# Patient Record
(demographics unavailable — no encounter records)

---

## 2024-12-23 NOTE — PHYSICAL EXAM
[4+] : 4+ [Normal Gait and Station] : normal gait and station [Normal muscle strength, symmetry and tone of facial, head and neck musculature] : normal muscle strength, symmetry and tone of facial, head and neck musculature [Normal] : no cervical lymphadenopathy [Exposed Vessel] : left anterior vessel not exposed [Increased Work of Breathing] : no increased work of breathing with use of accessory muscles and retractions

## 2024-12-23 NOTE — ASSESSMENT
[FreeTextEntry1] : 5 year F s/p tonsillectomy and adenoidectomy. Discussed that adenoids can grow back and that we will monitor for now. Any signs of recurrent nasal congestion or snoring they should let us know. Tonsils do not grow back. If persistent snoring sometimes will get repeat PSG. Monitor for now.  Due to concern for continued sleep symptoms post T&A will refer to Dr. Wakefield for sleep medicine evaluation.   Consider PSG. Defer to Dr. HERNANDEZ

## 2024-12-23 NOTE — REASON FOR VISIT
[Post-Operative Visit] : a post-operative visit for [Nasal Obstruction] : nasal obstruction [Nasal Discharge] : nasal discharge [Sleep Apnea/ Snoring] : sleep apnea/ snoring [Parents] : parents

## 2024-12-23 NOTE — HISTORY OF PRESENT ILLNESS
[de-identified] : 12-23-24 5 year M s/p T&A, ITR in 6/13/24. Doing well and most symptoms have improved except for daytime tiredness, still noticeable with some behavioral concerns. Nasal congestion with URIs, no ear or throat infections. Hx of asthma.   4-15-24 4 year M with SDB and nasal congestion History of asthma Noted to have large tonsils by dentist and PMD Planned hernia surgery with Dr. Garrett - want to consider doing surgery with ENT if needed  +Nasal congestion No prior nasal steroid use +Snoring, apnea, daytime tiredness, wakes up up at night, open mouth breathing, bruxism No prior PSG  No recent ear infections No otorrhea Passed [facility] No speech or hearing concern No recent throat infections Will get frequent infections with crop like cough No prior hospitalization for illness No bleeding or anesthesia issues